# Patient Record
Sex: MALE | Race: WHITE | NOT HISPANIC OR LATINO | Employment: OTHER | URBAN - METROPOLITAN AREA
[De-identification: names, ages, dates, MRNs, and addresses within clinical notes are randomized per-mention and may not be internally consistent; named-entity substitution may affect disease eponyms.]

---

## 2018-07-09 ENCOUNTER — OFFICE VISIT (OUTPATIENT)
Dept: MEDICAL GROUP | Facility: PHYSICIAN GROUP | Age: 68
End: 2018-07-09
Payer: MEDICARE

## 2018-07-09 VITALS
TEMPERATURE: 97.7 F | RESPIRATION RATE: 12 BRPM | HEIGHT: 68 IN | SYSTOLIC BLOOD PRESSURE: 142 MMHG | DIASTOLIC BLOOD PRESSURE: 82 MMHG | OXYGEN SATURATION: 95 % | HEART RATE: 76 BPM | BODY MASS INDEX: 35.16 KG/M2 | WEIGHT: 232 LBS

## 2018-07-09 DIAGNOSIS — N18.4 CHRONIC KIDNEY DISEASE, STAGE IV (SEVERE) (HCC): ICD-10-CM

## 2018-07-09 DIAGNOSIS — E11.9 DIABETES MELLITUS WITHOUT COMPLICATION (HCC): ICD-10-CM

## 2018-07-09 DIAGNOSIS — I15.9 SECONDARY HYPERTENSION: ICD-10-CM

## 2018-07-09 DIAGNOSIS — D64.9 ANEMIA, UNSPECIFIED TYPE: ICD-10-CM

## 2018-07-09 DIAGNOSIS — I10 ESSENTIAL HYPERTENSION: ICD-10-CM

## 2018-07-09 PROCEDURE — 99214 OFFICE O/P EST MOD 30 MIN: CPT | Performed by: FAMILY MEDICINE

## 2018-07-09 RX ORDER — ATORVASTATIN CALCIUM 40 MG/1
TABLET, FILM COATED ORAL
Qty: 90 TAB | Refills: 1 | Status: SHIPPED | OUTPATIENT
Start: 2018-07-09 | End: 2018-12-07 | Stop reason: SDUPTHER

## 2018-07-09 RX ORDER — ALLOPURINOL 300 MG/1
300 TABLET ORAL
Qty: 90 TAB | Refills: 1 | Status: SHIPPED | OUTPATIENT
Start: 2018-07-09 | End: 2019-07-13 | Stop reason: SDUPTHER

## 2018-07-09 RX ORDER — PIOGLITAZONEHYDROCHLORIDE 15 MG/1
15 TABLET ORAL
Qty: 90 TAB | Refills: 1 | Status: SHIPPED | OUTPATIENT
Start: 2018-07-09 | End: 2018-12-02 | Stop reason: SDUPTHER

## 2018-07-09 RX ORDER — CARVEDILOL 6.25 MG/1
TABLET ORAL
Qty: 90 TAB | Refills: 1 | Status: SHIPPED | OUTPATIENT
Start: 2018-07-09 | End: 2018-12-02 | Stop reason: SDUPTHER

## 2018-07-09 RX ORDER — LISINOPRIL 20 MG/1
TABLET ORAL
Qty: 30 TAB | Refills: 2 | Status: SHIPPED | OUTPATIENT
Start: 2018-07-09 | End: 2018-10-04 | Stop reason: SDUPTHER

## 2018-07-09 RX ORDER — PRAZOSIN HYDROCHLORIDE 5 MG/1
5 CAPSULE ORAL EVERY EVENING
Qty: 30 CAP | Refills: 3 | Status: SHIPPED | OUTPATIENT
Start: 2018-07-09 | End: 2023-07-09

## 2018-07-09 RX ORDER — CALCITRIOL 0.25 UG/1
0.25 CAPSULE, LIQUID FILLED ORAL DAILY
Qty: 30 CAP | Refills: 0 | Status: SHIPPED | OUTPATIENT
Start: 2018-07-09 | End: 2020-10-31

## 2018-07-09 ASSESSMENT — ENCOUNTER SYMPTOMS
RESPIRATORY NEGATIVE: 1
EYES NEGATIVE: 1
HEARTBURN: 0
COUGH: 0
CARDIOVASCULAR NEGATIVE: 1
DIZZINESS: 0
HEADACHES: 0
BLURRED VISION: 0
TINGLING: 0
MYALGIAS: 0
BRUISES/BLEEDS EASILY: 0
DEPRESSION: 0
NEUROLOGICAL NEGATIVE: 1
FEVER: 0
PSYCHIATRIC NEGATIVE: 1
HEMOPTYSIS: 0
PALPITATIONS: 0
MUSCULOSKELETAL NEGATIVE: 1
GASTROINTESTINAL NEGATIVE: 1
DOUBLE VISION: 0
CHILLS: 0
NAUSEA: 0

## 2018-07-09 NOTE — PROGRESS NOTES
Subjective:      Dc Carrasco is a 67 y.o. male who presents with Cough            1. Diabetes mellitus without complication (HCC)  a1c today 6.4  Currently treated for DM, taking meds and checking bs at home, trying to do DM diet.controlled      2. Anemia, unspecified type  Seeing william of hem/onc and low on iron and doing workup     3. Chronic kidney disease, stage IV (severe) (HCC)  Sees gomez  Patient is currently being followed for chronic renal insufficiency. They are avoiding nsaids and maintaining hydration and following renal diet. Taking all appropriate meds. No new change in urine frequency or volume.      4. Essential hypertension  Currently treated for HTN, taking meds with no CP or sob, monitors bp at home periodically. controlled      Past Medical History:  No date: BPH (benign prostatic hypertrophy)  No date: Gout  No date: HYPERPARATHYROIDISM  No date: Hypertension  No date: Kidney disease      Comment: Chronic  No date: Polycystic kidney disease  Past Surgical History:  2/6/2018: ENDOSCOPY PROCEDURE      Comment: Procedure: ENDOSCOPY PROCEDURE;  Surgeon:                Charly Knight M.D.;  Location: SURGERY                [facility];  Service: Gastroenterology  2/6/2018: COLONOSCOPY - ENDO      Comment: Procedure: COLONOSCOPY - ENDO;  Surgeon:                Charly Knight M.D.;  Location: SURGERY                [facility];  Service: Gastroenterology  No date: HERNIA REPAIR  No date: OPEN REDUCTION  Smoking status: Never Smoker                                                              Smokeless tobacco: Never Used                      Alcohol use: Yes           0.6 oz/week     Standard drinks or equivalent: 1 per week     Comment: Moderate    History reviewed.  No pertinent family history.      Current Outpatient Prescriptions: •  Testosterone 10 MG/ACT (2%) Gel, Apply  to skin as directed., Disp: , Rfl: •  aspirin EC (ECOTRIN) 81 MG Tablet Delayed Response, Take 81 mg by mouth  "every day., Disp: , Rfl: •  allopurinol (ZYLOPRIM) 300 MG Tab, Take 1 Tab by mouth every day., Disp: 90 Tab, Rfl: 1•  atorvastatin (LIPITOR) 40 MG Tab, TAKE 1 TABLET BY MOUTH AT BEDTIME, Disp: 90 Tab, Rfl: 1•  calcitRIOL (ROCALTROL) 0.25 MCG Cap, Take 1 Cap by mouth every day. Pt takes Mon, Wed, Fri, Disp: 30 Cap, Rfl: 0•  carvedilol (COREG) 6.25 MG Tab, TAKE 1/2 TABLET ORALLY TWICE DAILY, Disp: 90 Tab, Rfl: 1•  lisinopril (PRINIVIL) 20 MG Tab, TAKE 1 TABLET BY MOUTH ONCE DAILY, Disp: 30 Tab, Rfl: 2•  pioglitazone (ACTOS) 15 MG Tab, Take 1 Tab by mouth every day., Disp: 90 Tab, Rfl: 1•  prazosin (MINIPRESS) 5 MG Cap, Take 1 Cap by mouth every evening., Disp: 30 Cap, Rfl: 3    Patient was instructed on the use of medications, either prescriptions or OTC and informed on when the appropriate follow up time period should be. In addition, patient was also instructed that should any acute worsening occur that they should notify this clinic asap or call 911.            Review of Systems   Constitutional: Positive for malaise/fatigue. Negative for chills and fever.   HENT: Negative.  Negative for hearing loss.    Eyes: Negative.  Negative for blurred vision and double vision.   Respiratory: Negative.  Negative for cough and hemoptysis.    Cardiovascular: Negative.  Negative for chest pain and palpitations.   Gastrointestinal: Negative.  Negative for heartburn and nausea.   Genitourinary: Negative.  Negative for dysuria.   Musculoskeletal: Negative.  Negative for myalgias.   Skin: Negative.  Negative for rash.   Neurological: Negative.  Negative for dizziness, tingling and headaches.   Endo/Heme/Allergies: Negative.  Does not bruise/bleed easily.   Psychiatric/Behavioral: Negative.  Negative for depression and suicidal ideas.   All other systems reviewed and are negative.         Objective:     /82   Pulse 76   Temp 36.5 °C (97.7 °F)   Resp 12   Ht 1.732 m (5' 8.2\")   Wt 105.2 kg (232 lb)   SpO2 95%   BMI 35.07 " kg/m²      Physical Exam   Constitutional: He is oriented to person, place, and time. He appears well-developed and well-nourished. No distress.   HENT:   Head: Normocephalic and atraumatic.   Mouth/Throat: Oropharynx is clear and moist. No oropharyngeal exudate.   Eyes: Pupils are equal, round, and reactive to light.   Cardiovascular: Normal rate, regular rhythm, normal heart sounds and intact distal pulses.  Exam reveals no gallop and no friction rub.    No murmur heard.  Pulmonary/Chest: Effort normal and breath sounds normal. No respiratory distress. He has no wheezes. He has no rales. He exhibits no tenderness.   Neurological: He is alert and oriented to person, place, and time.   Skin: He is not diaphoretic.   Psychiatric: He has a normal mood and affect. His behavior is normal. Judgment and thought content normal.   Nursing note and vitals reviewed.              Assessment/Plan:     1. Diabetes mellitus without complication ([facility])      2. Anemia, unspecified type      3. Chronic kidney disease, stage IV (severe) ([facility])      4. Essential hypertension

## 2018-10-04 DIAGNOSIS — I15.9 SECONDARY HYPERTENSION: ICD-10-CM

## 2018-10-04 RX ORDER — LISINOPRIL 20 MG/1
TABLET ORAL
Qty: 90 TAB | Refills: 0 | Status: SHIPPED | OUTPATIENT
Start: 2018-10-04 | End: 2019-01-10 | Stop reason: SDUPTHER

## 2018-12-02 DIAGNOSIS — I15.9 SECONDARY HYPERTENSION: ICD-10-CM

## 2018-12-03 RX ORDER — PIOGLITAZONEHYDROCHLORIDE 15 MG/1
TABLET ORAL
Qty: 90 TAB | Refills: 1 | Status: SHIPPED | OUTPATIENT
Start: 2018-12-03 | End: 2019-10-22 | Stop reason: SDUPTHER

## 2018-12-03 RX ORDER — CARVEDILOL 6.25 MG/1
TABLET ORAL
Qty: 90 TAB | Refills: 1 | Status: SHIPPED | OUTPATIENT
Start: 2018-12-03 | End: 2018-12-06 | Stop reason: SDUPTHER

## 2018-12-06 ENCOUNTER — OFFICE VISIT (OUTPATIENT)
Dept: MEDICAL GROUP | Facility: PHYSICIAN GROUP | Age: 68
End: 2018-12-06
Payer: MEDICARE

## 2018-12-06 VITALS
TEMPERATURE: 97.7 F | RESPIRATION RATE: 16 BRPM | OXYGEN SATURATION: 98 % | HEIGHT: 69 IN | SYSTOLIC BLOOD PRESSURE: 122 MMHG | DIASTOLIC BLOOD PRESSURE: 70 MMHG | HEART RATE: 75 BPM | WEIGHT: 246 LBS | BODY MASS INDEX: 36.43 KG/M2

## 2018-12-06 DIAGNOSIS — Z71.84 TRAVEL ADVICE ENCOUNTER: ICD-10-CM

## 2018-12-06 DIAGNOSIS — N18.4 CHRONIC KIDNEY DISEASE, STAGE IV (SEVERE) (HCC): ICD-10-CM

## 2018-12-06 DIAGNOSIS — E11.9 DIABETES MELLITUS WITHOUT COMPLICATION (HCC): ICD-10-CM

## 2018-12-06 DIAGNOSIS — I10 ESSENTIAL HYPERTENSION: ICD-10-CM

## 2018-12-06 PROCEDURE — 99214 OFFICE O/P EST MOD 30 MIN: CPT | Performed by: FAMILY MEDICINE

## 2018-12-06 RX ORDER — ALFUZOSIN HYDROCHLORIDE 10 MG/1
TABLET, EXTENDED RELEASE ORAL DAILY
COMMUNITY
End: 2023-10-12

## 2018-12-06 RX ORDER — CIPROFLOXACIN 250 MG/1
250 TABLET, FILM COATED ORAL 2 TIMES DAILY
Qty: 40 TAB | Refills: 0 | Status: SHIPPED | OUTPATIENT
Start: 2018-12-06 | End: 2020-10-31

## 2018-12-06 RX ORDER — CARVEDILOL 6.25 MG/1
3.12 TABLET ORAL 2 TIMES DAILY
Qty: 90 TAB | Refills: 1 | Status: SHIPPED | OUTPATIENT
Start: 2018-12-06 | End: 2020-01-23

## 2018-12-06 ASSESSMENT — ENCOUNTER SYMPTOMS
HEMOPTYSIS: 0
CHILLS: 0
GASTROINTESTINAL NEGATIVE: 1
CARDIOVASCULAR NEGATIVE: 1
HEADACHES: 0
MUSCULOSKELETAL NEGATIVE: 1
PALPITATIONS: 0
BRUISES/BLEEDS EASILY: 0
NAUSEA: 0
COUGH: 0
TINGLING: 0
BLURRED VISION: 0
HEARTBURN: 0
MYALGIAS: 0
NEUROLOGICAL NEGATIVE: 1
CONSTITUTIONAL NEGATIVE: 1
PSYCHIATRIC NEGATIVE: 1
RESPIRATORY NEGATIVE: 1
DEPRESSION: 0
EYES NEGATIVE: 1
DOUBLE VISION: 0
FEVER: 0
DIZZINESS: 0

## 2018-12-06 NOTE — PROGRESS NOTES
Subjective:      Dc Carrasco is a 67 y.o. male who presents with Diabetes            1. Essential hypertension  Currently treated for HTN, taking meds with no CP or sob, monitors bp at home periodically. controlled    - carvedilol (COREG) 6.25 MG Tab; Take 0.5 Tabs by mouth 2 Times a Day.  Dispense: 90 Tab; Refill: 1    2. Travel advice encounter  Traveling to [facility] and would like some pills for diarrhea should he get it there  - ciprofloxacin (CIPRO) 250 MG Tab; Take 1 Tab by mouth 2 times a day.  Dispense: 40 Tab; Refill: 0    3. Diabetes mellitus without complication (HCC)  Currently treated for DM, taking meds and checking bs at home, trying to do DM diet.controlled      4. Chronic kidney disease, stage IV (severe) (HCC)  Patient is currently being followed for chronic renal insufficiency. They are avoiding nsaids and maintaining hydration and following renal diet. Taking all appropriate meds. No new change in urine frequency or volume.  Seeing monty    Past Medical History:  No date: BPH (benign prostatic hypertrophy)  No date: Gout  No date: HYPERPARATHYROIDISM  No date: Hypertension  No date: Kidney disease      Comment:  Chronic  No date: Polycystic kidney disease  Past Surgical History:  2/6/2018: ENDOSCOPY PROCEDURE      Comment:  Procedure: ENDOSCOPY PROCEDURE;  Surgeon: Charly Knight M.D.;  Location: SURGERY [facility];  Service:               Gastroenterology  2/6/2018: COLONOSCOPY - ENDO      Comment:  Procedure: COLONOSCOPY - ENDO;  Surgeon: Charly Knight M.D.;  Location: SURGERY [facility];  Service:               Gastroenterology  No date: HERNIA REPAIR  No date: OPEN REDUCTION  Smoking status: Never Smoker                                                              Smokeless tobacco: Never Used                      Alcohol use: Yes           0.6 oz/week     Standard drinks or equivalent: 1 per week     Comment: Moderate    No family history on  file.      Current Outpatient Prescriptions: •  Tadalafil (CIALIS PO), Take  by mouth., Disp: , Rfl: •  MELATONIN PO, Take  by mouth., Disp: , Rfl: •  alfuzosin (UROXATRAL) 10 MG SR tablet, Take  by mouth every day., Disp: , Rfl: •  carvedilol (COREG) 6.25 MG Tab, Take 0.5 Tabs by mouth 2 Times a Day., Disp: 90 Tab, Rfl: 1•  ciprofloxacin (CIPRO) 250 MG Tab, Take 1 Tab by mouth 2 times a day., Disp: 40 Tab, Rfl: 0•  pioglitazone (ACTOS) 15 MG Tab, TAKE 1 TABLET BY MOUTH EVERY DAY, Disp: 90 Tab, Rfl: 1•  lisinopril (PRINIVIL) 20 MG Tab, TAKE 1 TABLET BY MOUTH EVERY DAY, Disp: 90 Tab, Rfl: 0•  allopurinol (ZYLOPRIM) 300 MG Tab, Take 1 Tab by mouth every day., Disp: 90 Tab, Rfl: 1•  atorvastatin (LIPITOR) 40 MG Tab, TAKE 1 TABLET BY MOUTH AT BEDTIME, Disp: 90 Tab, Rfl: 1•  calcitRIOL (ROCALTROL) 0.25 MCG Cap, Take 1 Cap by mouth every day. Pt takes Mon, Wed, Fri (Patient not taking: Reported on 12/6/2018), Disp: 30 Cap, Rfl: 0•  prazosin (MINIPRESS) 5 MG Cap, Take 1 Cap by mouth every evening. (Patient not taking: Reported on 12/6/2018), Disp: 30 Cap, Rfl: 3•  Testosterone 10 MG/ACT (2%) Gel, Apply  to skin as directed., Disp: , Rfl: •  aspirin EC (ECOTRIN) 81 MG Tablet Delayed Response, Take 81 mg by mouth every day., Disp: , Rfl:     Patient was instructed on the use of medications, either prescriptions or OTC and informed on when the appropriate follow up time period should be. In addition, patient was also instructed that should any acute worsening occur that they should notify this clinic asap or call 911.            Review of Systems   Constitutional: Negative.  Negative for chills and fever.   HENT: Negative.  Negative for hearing loss.    Eyes: Negative.  Negative for blurred vision and double vision.   Respiratory: Negative.  Negative for cough and hemoptysis.    Cardiovascular: Negative.  Negative for chest pain and palpitations.   Gastrointestinal: Negative.  Negative for heartburn and nausea.  "  Genitourinary: Negative.  Negative for dysuria.   Musculoskeletal: Negative.  Negative for myalgias.   Skin: Negative.  Negative for rash.   Neurological: Negative.  Negative for dizziness, tingling and headaches.   Endo/Heme/Allergies: Negative.  Does not bruise/bleed easily.   Psychiatric/Behavioral: Negative.  Negative for depression and suicidal ideas.   All other systems reviewed and are negative.         Objective:     /70 (BP Location: Left arm, Patient Position: Sitting)   Pulse 75   Temp 36.5 °C (97.7 °F)   Resp 16   Ht 1.753 m (5' 9\")   Wt 111.6 kg (246 lb)   SpO2 98%   BMI 36.33 kg/m²      Physical Exam   Constitutional: He is oriented to person, place, and time. He appears well-developed and well-nourished. No distress.   HENT:   Head: Normocephalic and atraumatic.   Right Ear: External ear normal.   Left Ear: External ear normal.   Nose: Nose normal.   Mouth/Throat: Oropharynx is clear and moist. No oropharyngeal exudate.   Eyes: Pupils are equal, round, and reactive to light. Right eye exhibits no discharge. Left eye exhibits no discharge. No scleral icterus.   Neck: Normal range of motion. Neck supple. No JVD present. No tracheal deviation present. No thyromegaly present.   Cardiovascular: Normal rate, regular rhythm, normal heart sounds and intact distal pulses.  Exam reveals no gallop and no friction rub.    No murmur heard.  Pulmonary/Chest: Effort normal and breath sounds normal. No stridor. No respiratory distress. He has no wheezes. He has no rales. He exhibits no tenderness.   Abdominal: Soft. He exhibits no distension. There is no tenderness.   Lymphadenopathy:     He has no cervical adenopathy.   Neurological: He is alert and oriented to person, place, and time. No cranial nerve deficit.   Skin: He is not diaphoretic.   Psychiatric: He has a normal mood and affect. His behavior is normal. Judgment and thought content normal.   Nursing note and vitals reviewed.            "   Assessment/Plan:     1. Essential hypertension    - carvedilol (COREG) 6.25 MG Tab; Take 0.5 Tabs by mouth 2 Times a Day.  Dispense: 90 Tab; Refill: 1    2. Travel advice encounter    - ciprofloxacin (CIPRO) 250 MG Tab; Take 1 Tab by mouth 2 times a day.  Dispense: 40 Tab; Refill: 0    3. Diabetes mellitus without complication (HCC)      4. Chronic kidney disease, stage IV (severe) (HCC)

## 2018-12-07 DIAGNOSIS — I15.9 SECONDARY HYPERTENSION: ICD-10-CM

## 2018-12-10 RX ORDER — ATORVASTATIN CALCIUM 40 MG/1
TABLET, FILM COATED ORAL
Qty: 90 TAB | Refills: 1 | Status: SHIPPED | OUTPATIENT
Start: 2018-12-10 | End: 2019-07-09 | Stop reason: SDUPTHER

## 2018-12-10 NOTE — TELEPHONE ENCOUNTER
Was the patient seen in the last year in this department? Yes    Does patient have an active prescription for medications requested? Yes    Received Request Via: Pharmacy    Last Visit:12/6/18  Last Lab:12/4/18

## 2019-01-10 DIAGNOSIS — I15.9 SECONDARY HYPERTENSION: ICD-10-CM

## 2019-01-10 RX ORDER — LISINOPRIL 20 MG/1
TABLET ORAL
Qty: 90 TAB | Refills: 0 | Status: SHIPPED | OUTPATIENT
Start: 2019-01-10 | End: 2019-07-09 | Stop reason: SDUPTHER

## 2019-01-10 NOTE — TELEPHONE ENCOUNTER
Was the patient seen in the last year in this department? Yes    Does patient have an active prescription for medications requested? Yes    Received Request Via: Pharmacy       Last visit: 12/06/18  Last labs:12/04/18

## 2019-01-17 DIAGNOSIS — E11.9 DIABETES MELLITUS WITHOUT COMPLICATION (HCC): ICD-10-CM

## 2019-05-16 LAB — HBA1C MFR BLD: 6.4 % (ref 0–5.6)

## 2019-05-21 ENCOUNTER — OFFICE VISIT (OUTPATIENT)
Dept: MEDICAL GROUP | Facility: PHYSICIAN GROUP | Age: 69
End: 2019-05-21
Payer: MEDICARE

## 2019-05-21 VITALS
OXYGEN SATURATION: 98 % | HEIGHT: 69 IN | RESPIRATION RATE: 12 BRPM | SYSTOLIC BLOOD PRESSURE: 142 MMHG | DIASTOLIC BLOOD PRESSURE: 80 MMHG | TEMPERATURE: 97.5 F | WEIGHT: 223 LBS | HEART RATE: 85 BPM | BODY MASS INDEX: 33.03 KG/M2

## 2019-05-21 DIAGNOSIS — E11.9 DIABETES MELLITUS WITHOUT COMPLICATION (HCC): ICD-10-CM

## 2019-05-21 DIAGNOSIS — N18.4 CHRONIC KIDNEY DISEASE, STAGE IV (SEVERE) (HCC): ICD-10-CM

## 2019-05-21 DIAGNOSIS — B35.1 TOENAIL FUNGUS: ICD-10-CM

## 2019-05-21 DIAGNOSIS — D50.8 IRON DEFICIENCY ANEMIA SECONDARY TO INADEQUATE DIETARY IRON INTAKE: ICD-10-CM

## 2019-05-21 PROCEDURE — 99214 OFFICE O/P EST MOD 30 MIN: CPT | Performed by: FAMILY MEDICINE

## 2019-05-21 RX ORDER — CICLOPIROX 80 MG/ML
SOLUTION TOPICAL
Qty: 6.6 ML | Refills: 3 | Status: SHIPPED | OUTPATIENT
Start: 2019-05-21 | End: 2019-06-04 | Stop reason: SDUPTHER

## 2019-05-21 RX ORDER — CICLOPIROX 80 MG/ML
SOLUTION TOPICAL
Qty: 6.6 ML | Refills: 3 | Status: SHIPPED | OUTPATIENT
Start: 2019-05-21 | End: 2019-05-21 | Stop reason: SDUPTHER

## 2019-05-21 ASSESSMENT — PATIENT HEALTH QUESTIONNAIRE - PHQ9: CLINICAL INTERPRETATION OF PHQ2 SCORE: 0

## 2019-05-22 ASSESSMENT — ENCOUNTER SYMPTOMS
RESPIRATORY NEGATIVE: 1
FEVER: 0
EYES NEGATIVE: 1
MYALGIAS: 0
GASTROINTESTINAL NEGATIVE: 1
HEARTBURN: 0
DIZZINESS: 0
BRUISES/BLEEDS EASILY: 0
MUSCULOSKELETAL NEGATIVE: 1
PALPITATIONS: 0
NAUSEA: 0
HEMOPTYSIS: 0
CHILLS: 0
DOUBLE VISION: 0
DEPRESSION: 0
PSYCHIATRIC NEGATIVE: 1
NEUROLOGICAL NEGATIVE: 1
HEADACHES: 0
CARDIOVASCULAR NEGATIVE: 1
TINGLING: 0
BLURRED VISION: 0
COUGH: 0
CONSTITUTIONAL NEGATIVE: 1

## 2019-05-22 NOTE — PROGRESS NOTES
Subjective:      Dc Carrasco is a 68 y.o. male who presents with Hypertension            1. Chronic kidney disease, stage IV (severe) (HCC)  Stable, seeing nephrology  Patient is currently being followed for chronic renal insufficiency. They are avoiding nsaids and maintaining hydration and following renal diet. Taking all appropriate meds. No new change in urine frequency or volume.      2. Diabetes mellitus without complication (HCC)  a1c under control  Currently treated for DM, taking meds and checking bs at home, trying to do DM diet.controlled      3. Iron deficiency anemia secondary to inadequate dietary iron intake  Will get iron infusion per hematology    4. Toenail fungus    - ciclopirox (PENLAC) 8 % solution; apply  Dispense: 6.6 mL; Refill: 3    Past Medical History:  No date: BPH (benign prostatic hypertrophy)  No date: Gout  No date: HYPERPARATHYROIDISM  No date: Hypertension  No date: Kidney disease      Comment:  Chronic  No date: Polycystic kidney disease  Past Surgical History:  2/6/2018: ENDOSCOPY PROCEDURE      Comment:  Procedure: ENDOSCOPY PROCEDURE;  Surgeon: Charly Knight M.D.;  Location: SURGERY [facility];  Service:               Gastroenterology  2/6/2018: COLONOSCOPY - ENDO      Comment:  Procedure: COLONOSCOPY - ENDO;  Surgeon: Charly Knight M.D.;  Location: SURGERY [facility];  Service:               Gastroenterology  No date: HERNIA REPAIR  No date: OPEN REDUCTION  Smoking status: Never Smoker                                                              Smokeless tobacco: Never Used                      Alcohol use: Yes           0.6 oz/week     Standard drinks or equivalent: 1 per week     Comment: Moderate    No family history on file.      Current Outpatient Prescriptions: •  ciclopirox (PENLAC) 8 % solution, apply, Disp: 6.6 mL, Rfl: 3•  lisinopril (PRINIVIL) 20 MG Tab, TAKE 1 TABLET BY MOUTH ONCE DAILY, Disp: 90 Tab, Rfl: 0•   atorvastatin (LIPITOR) 40 MG Tab, TAKE 1 TABLET BY MOUTH EVERYDAY AT BEDTIME, Disp: 90 Tab, Rfl: 1•  Tadalafil (CIALIS PO), Take  by mouth., Disp: , Rfl: •  MELATONIN PO, Take  by mouth., Disp: , Rfl: •  alfuzosin (UROXATRAL) 10 MG SR tablet, Take  by mouth every day., Disp: , Rfl: •  carvedilol (COREG) 6.25 MG Tab, Take 0.5 Tabs by mouth 2 Times a Day., Disp: 90 Tab, Rfl: 1•  ciprofloxacin (CIPRO) 250 MG Tab, Take 1 Tab by mouth 2 times a day., Disp: 40 Tab, Rfl: 0•  pioglitazone (ACTOS) 15 MG Tab, TAKE 1 TABLET BY MOUTH EVERY DAY, Disp: 90 Tab, Rfl: 1•  allopurinol (ZYLOPRIM) 300 MG Tab, Take 1 Tab by mouth every day., Disp: 90 Tab, Rfl: 1•  calcitRIOL (ROCALTROL) 0.25 MCG Cap, Take 1 Cap by mouth every day. Pt takes Mon, Wed, Fri (Patient not taking: Reported on 12/6/2018), Disp: 30 Cap, Rfl: 0•  prazosin (MINIPRESS) 5 MG Cap, Take 1 Cap by mouth every evening. (Patient not taking: Reported on 12/6/2018), Disp: 30 Cap, Rfl: 3•  Testosterone 10 MG/ACT (2%) Gel, Apply  to skin as directed., Disp: , Rfl: •  aspirin EC (ECOTRIN) 81 MG Tablet Delayed Response, Take 81 mg by mouth every day., Disp: , Rfl:     Patient was instructed on the use of medications, either prescriptions or OTC and informed on when the appropriate follow up time period should be. In addition, patient was also instructed that should any acute worsening occur that they should notify this clinic asap or call 911.            Review of Systems   Constitutional: Negative.  Negative for chills and fever.   HENT: Negative.  Negative for hearing loss.    Eyes: Negative.  Negative for blurred vision and double vision.   Respiratory: Negative.  Negative for cough and hemoptysis.    Cardiovascular: Negative.  Negative for chest pain and palpitations.   Gastrointestinal: Negative.  Negative for heartburn and nausea.   Genitourinary: Negative.  Negative for dysuria.   Musculoskeletal: Negative.  Negative for myalgias.   Skin: Negative.  Negative for rash.  "  Neurological: Negative.  Negative for dizziness, tingling and headaches.   Endo/Heme/Allergies: Negative.  Does not bruise/bleed easily.   Psychiatric/Behavioral: Negative.  Negative for depression and suicidal ideas.   All other systems reviewed and are negative.         Objective:     /80   Pulse 85   Temp 36.4 °C (97.5 °F)   Resp 12   Ht 1.753 m (5' 9\")   Wt 101.2 kg (223 lb)   SpO2 98%   BMI 32.93 kg/m²      Physical Exam   Constitutional: He is oriented to person, place, and time. He appears well-developed and well-nourished. No distress.   HENT:   Head: Normocephalic and atraumatic.   Mouth/Throat: Oropharynx is clear and moist. No oropharyngeal exudate.   Eyes: Pupils are equal, round, and reactive to light.   Cardiovascular: Normal rate, regular rhythm, normal heart sounds and intact distal pulses.  Exam reveals no gallop and no friction rub.    No murmur heard.  Pulmonary/Chest: Effort normal and breath sounds normal. No respiratory distress. He has no wheezes. He has no rales. He exhibits no tenderness.   Neurological: He is alert and oriented to person, place, and time.   Skin: He is not diaphoretic.   Extensive toenail fungus present   Psychiatric: He has a normal mood and affect. His behavior is normal. Judgment and thought content normal.   Nursing note and vitals reviewed.              Assessment/Plan:     1. Chronic kidney disease, stage IV (severe) ([facility])      2. Diabetes mellitus without complication ([facility])      3. Iron deficiency anemia secondary to inadequate dietary iron intake      4. Toenail fungus    - ciclopirox (PENLAC) 8 % solution; apply  Dispense: 6.6 mL; Refill: 3      "

## 2019-05-23 ENCOUNTER — TELEPHONE (OUTPATIENT)
Dept: MEDICAL GROUP | Facility: PHYSICIAN GROUP | Age: 69
End: 2019-05-23

## 2019-05-24 NOTE — TELEPHONE ENCOUNTER
Patient pharmacy needs clarification on his Sig for Ciclopirox 8%. They SIG just says apply. They need more information.

## 2019-06-04 DIAGNOSIS — B35.1 TOENAIL FUNGUS: ICD-10-CM

## 2019-06-04 RX ORDER — CICLOPIROX 80 MG/ML
SOLUTION TOPICAL
Qty: 6.6 ML | Refills: 3 | Status: SHIPPED | OUTPATIENT
Start: 2019-06-04 | End: 2023-07-09

## 2019-07-09 DIAGNOSIS — I15.9 SECONDARY HYPERTENSION: ICD-10-CM

## 2019-07-09 RX ORDER — LISINOPRIL 20 MG/1
TABLET ORAL
Qty: 90 TAB | Refills: 0 | Status: SHIPPED | OUTPATIENT
Start: 2019-07-09 | End: 2019-09-13 | Stop reason: SDUPTHER

## 2019-07-09 RX ORDER — ATORVASTATIN CALCIUM 40 MG/1
TABLET, FILM COATED ORAL
Qty: 90 TAB | Refills: 0 | Status: SHIPPED | OUTPATIENT
Start: 2019-07-09 | End: 2019-10-04 | Stop reason: SDUPTHER

## 2019-07-09 NOTE — TELEPHONE ENCOUNTER
Was the patient seen in the last year in this department? Yes    Does patient have an active prescription for medications requested? Yes    Received Request Via: Pharmacy     Last Visit:05/21/2019  Last Lab:04/17/2019

## 2019-07-13 DIAGNOSIS — I15.9 SECONDARY HYPERTENSION: ICD-10-CM

## 2019-07-15 RX ORDER — ALLOPURINOL 300 MG/1
TABLET ORAL
Qty: 100 TAB | Refills: 1 | Status: SHIPPED | OUTPATIENT
Start: 2019-07-15 | End: 2020-01-20

## 2019-09-11 DIAGNOSIS — I15.9 SECONDARY HYPERTENSION: ICD-10-CM

## 2019-09-13 DIAGNOSIS — I15.9 SECONDARY HYPERTENSION: ICD-10-CM

## 2019-09-13 NOTE — TELEPHONE ENCOUNTER
Was the patient seen in the last year in this department? Yes    Does patient have an active prescription for medications requested? Yes    Received Request Via: Pharmacy     Patient called stating he is currently taking 2 20 mg and needs a new Rx for the Lisinopril. Patient stated he would like the 40 mg or to give him more tabs for the 20 mg. Please advise.

## 2019-09-13 NOTE — TELEPHONE ENCOUNTER
Was the patient seen in the last year in this department? Yes    Does patient have an active prescription for medications requested? Yes    Received Request Via: Pharmacy     Last visit  5-21-19      Last lab   4-17-19

## 2019-09-16 RX ORDER — LISINOPRIL 20 MG/1
20 TABLET ORAL
Qty: 90 TAB | Refills: 3 | Status: SHIPPED | OUTPATIENT
Start: 2019-09-16 | End: 2020-10-31

## 2019-09-16 RX ORDER — LISINOPRIL 20 MG/1
TABLET ORAL
Qty: 90 TAB | Refills: 0 | Status: ON HOLD | OUTPATIENT
Start: 2019-09-16 | End: 2023-07-10

## 2019-10-04 DIAGNOSIS — I15.9 SECONDARY HYPERTENSION: ICD-10-CM

## 2019-10-07 RX ORDER — ATORVASTATIN CALCIUM 40 MG/1
TABLET, FILM COATED ORAL
Qty: 100 TAB | Refills: 1 | Status: SHIPPED | OUTPATIENT
Start: 2019-10-07 | End: 2020-05-04 | Stop reason: SDUPTHER

## 2019-10-22 ENCOUNTER — APPOINTMENT (RX ONLY)
Dept: URBAN - METROPOLITAN AREA CLINIC 22 | Facility: CLINIC | Age: 69
Setting detail: DERMATOLOGY
End: 2019-10-22

## 2019-10-22 DIAGNOSIS — L82.1 OTHER SEBORRHEIC KERATOSIS: ICD-10-CM

## 2019-10-22 DIAGNOSIS — D22 MELANOCYTIC NEVI: ICD-10-CM

## 2019-10-22 DIAGNOSIS — L81.4 OTHER MELANIN HYPERPIGMENTATION: ICD-10-CM

## 2019-10-22 DIAGNOSIS — L57.0 ACTINIC KERATOSIS: ICD-10-CM

## 2019-10-22 DIAGNOSIS — I15.9 SECONDARY HYPERTENSION: ICD-10-CM

## 2019-10-22 DIAGNOSIS — D18.0 HEMANGIOMA: ICD-10-CM

## 2019-10-22 PROBLEM — D22.9 MELANOCYTIC NEVI, UNSPECIFIED: Status: ACTIVE | Noted: 2019-10-22

## 2019-10-22 PROBLEM — D18.01 HEMANGIOMA OF SKIN AND SUBCUTANEOUS TISSUE: Status: ACTIVE | Noted: 2019-10-22

## 2019-10-22 PROBLEM — I10 ESSENTIAL (PRIMARY) HYPERTENSION: Status: ACTIVE | Noted: 2019-10-22

## 2019-10-22 PROBLEM — E13.9 OTHER SPECIFIED DIABETES MELLITUS WITHOUT COMPLICATIONS: Status: ACTIVE | Noted: 2019-10-22

## 2019-10-22 PROBLEM — E78.5 HYPERLIPIDEMIA, UNSPECIFIED: Status: ACTIVE | Noted: 2019-10-22

## 2019-10-22 PROCEDURE — ? LIQUID NITROGEN

## 2019-10-22 PROCEDURE — 99202 OFFICE O/P NEW SF 15 MIN: CPT | Mod: 25

## 2019-10-22 PROCEDURE — ? COUNSELING

## 2019-10-22 PROCEDURE — 17000 DESTRUCT PREMALG LESION: CPT

## 2019-10-22 PROCEDURE — 17003 DESTRUCT PREMALG LES 2-14: CPT

## 2019-10-22 ASSESSMENT — LOCATION DETAILED DESCRIPTION DERM
LOCATION DETAILED: RIGHT LATERAL MALAR CHEEK
LOCATION DETAILED: LEFT SUPERIOR FOREHEAD
LOCATION DETAILED: LEFT FOREHEAD
LOCATION DETAILED: LEFT MEDIAL FOREHEAD
LOCATION DETAILED: RIGHT CLAVICULAR SKIN
LOCATION DETAILED: RIGHT SUPERIOR FOREHEAD

## 2019-10-22 ASSESSMENT — LOCATION ZONE DERM
LOCATION ZONE: FACE
LOCATION ZONE: TRUNK

## 2019-10-22 ASSESSMENT — LOCATION SIMPLE DESCRIPTION DERM
LOCATION SIMPLE: RIGHT FOREHEAD
LOCATION SIMPLE: RIGHT CHEEK
LOCATION SIMPLE: LEFT FOREHEAD
LOCATION SIMPLE: RIGHT CLAVICULAR SKIN

## 2019-10-22 NOTE — PROCEDURE: LIQUID NITROGEN
Render Note In Bullet Format When Appropriate: No
Duration Of Freeze Thaw-Cycle (Seconds): 5
Post-Care Instructions: I reviewed with the patient in detail post-care instructions. Patient is to wear sunprotection, and avoid picking at any of the treated lesions. Pt may apply Vaseline to crusted or scabbing areas.
Render Post-Care Instructions In Note?: yes
Number Of Freeze-Thaw Cycles: 1 freeze-thaw cycle
Consent: The patient's consent was obtained including but not limited to risks of crusting, scabbing, blistering, scarring, darker or lighter pigmentary change, recurrence, incomplete removal and infection.
Detail Level: Simple

## 2019-10-22 NOTE — TELEPHONE ENCOUNTER
Was the patient seen in the last year in this department? Yes     Does patient have an active prescription for medications requested? Yes     Received Request Via: Pharmacy      Last visit  5-21-19        Last lab   10/22/2019

## 2019-10-23 RX ORDER — PIOGLITAZONEHYDROCHLORIDE 15 MG/1
TABLET ORAL
Qty: 100 TAB | Refills: 1 | Status: SHIPPED | OUTPATIENT
Start: 2019-10-23 | End: 2020-04-01 | Stop reason: SDUPTHER

## 2020-01-19 DIAGNOSIS — I15.9 SECONDARY HYPERTENSION: ICD-10-CM

## 2020-01-20 RX ORDER — ALLOPURINOL 300 MG/1
TABLET ORAL
Qty: 90 TAB | Refills: 2 | Status: SHIPPED | OUTPATIENT
Start: 2020-01-20 | End: 2021-03-18

## 2020-01-23 DIAGNOSIS — I10 ESSENTIAL HYPERTENSION: ICD-10-CM

## 2020-01-23 RX ORDER — CARVEDILOL 6.25 MG/1
6.25 TABLET ORAL 2 TIMES DAILY WITH MEALS
Qty: 90 TAB | Refills: 1 | Status: SHIPPED | OUTPATIENT
Start: 2020-01-23 | End: 2020-02-03

## 2020-01-31 DIAGNOSIS — I10 ESSENTIAL HYPERTENSION: ICD-10-CM

## 2020-02-03 RX ORDER — CARVEDILOL 6.25 MG/1
6.25 TABLET ORAL 2 TIMES DAILY
Qty: 180 TAB | Refills: 1 | Status: SHIPPED | OUTPATIENT
Start: 2020-02-03 | End: 2020-10-05

## 2020-04-01 DIAGNOSIS — I15.9 SECONDARY HYPERTENSION: ICD-10-CM

## 2020-04-01 RX ORDER — PIOGLITAZONEHYDROCHLORIDE 15 MG/1
15 TABLET ORAL
Qty: 100 TAB | Refills: 1 | Status: SHIPPED | OUTPATIENT
Start: 2020-04-01 | End: 2020-09-29

## 2020-04-01 NOTE — TELEPHONE ENCOUNTER
Received request via: Patient    Was the patient seen in the last year in this department? Yes    Does the patient have an active prescription (recently filled or refills available) for medication(s) requested? no

## 2020-05-04 DIAGNOSIS — I15.9 SECONDARY HYPERTENSION: ICD-10-CM

## 2020-05-04 RX ORDER — ATORVASTATIN CALCIUM 40 MG/1
TABLET, FILM COATED ORAL
Qty: 100 TAB | Refills: 0 | Status: SHIPPED | OUTPATIENT
Start: 2020-05-04 | End: 2020-10-05

## 2020-05-28 ENCOUNTER — TELEPHONE (OUTPATIENT)
Dept: MEDICAL GROUP | Facility: PHYSICIAN GROUP | Age: 70
End: 2020-05-28

## 2020-05-28 NOTE — TELEPHONE ENCOUNTER
Pt stated he was at [facility] in [place] and needed his labs faxed over so they could draw blood but there is no current labs ordered in his chart for him

## 2020-06-02 ENCOUNTER — OFFICE VISIT (OUTPATIENT)
Dept: MEDICAL GROUP | Facility: PHYSICIAN GROUP | Age: 70
End: 2020-06-02
Payer: MEDICARE

## 2020-06-02 VITALS
WEIGHT: 218.4 LBS | HEIGHT: 70 IN | SYSTOLIC BLOOD PRESSURE: 180 MMHG | TEMPERATURE: 99 F | HEART RATE: 68 BPM | RESPIRATION RATE: 16 BRPM | DIASTOLIC BLOOD PRESSURE: 82 MMHG | OXYGEN SATURATION: 96 % | BODY MASS INDEX: 31.26 KG/M2

## 2020-06-02 DIAGNOSIS — I10 ESSENTIAL HYPERTENSION: ICD-10-CM

## 2020-06-02 DIAGNOSIS — N18.5 CKD (CHRONIC KIDNEY DISEASE), STAGE V (HCC): ICD-10-CM

## 2020-06-02 DIAGNOSIS — E11.9 DIABETES MELLITUS WITHOUT COMPLICATION (HCC): ICD-10-CM

## 2020-06-02 DIAGNOSIS — Z01.818 PREOP EXAMINATION: ICD-10-CM

## 2020-06-02 PROCEDURE — 99214 OFFICE O/P EST MOD 30 MIN: CPT | Performed by: FAMILY MEDICINE

## 2020-06-02 ASSESSMENT — PATIENT HEALTH QUESTIONNAIRE - PHQ9: CLINICAL INTERPRETATION OF PHQ2 SCORE: 0

## 2020-06-02 ASSESSMENT — FIBROSIS 4 INDEX: FIB4 SCORE: 2.09

## 2020-06-03 NOTE — PROGRESS NOTES
Over 50% of this 25 minute visit ( all time was face to face) was spent on counseling and coordination of care for the problem of      Patient with stage V CRI and scheduled to do kidney transplant an [facility]  preop labs and tests requested by them    1. CKD (chronic kidney disease), stage V (HCC)    - Prothrombin Time; Future  - US-CAROTID DOPPLER BILAT; Future  - US-EXTREMITY ARTERY LOWER UNILAT RIGHT; Future  - US-EXTREMITY ARTERY LOWER UNILAT LEFT; Future  - REFERRAL TO GASTROENTEROLOGY  - EC-STEPHANE W/ CONT; Future  - [id number]; Future  - DX-CHEST-2 VIEWS; Future  - CT-ABDOMEN-PELVIS W/O; Future    2. Preop examination    - Prothrombin Time; Future  - US-CAROTID DOPPLER BILAT; Future  - US-EXTREMITY ARTERY LOWER UNILAT RIGHT; Future  - US-EXTREMITY ARTERY LOWER UNILAT LEFT; Future  - REFERRAL TO GASTROENTEROLOGY  - EC-STEPHANE W/ CONT; Future  - [id number]; Future  - DX-CHEST-2 VIEWS; Future  - CT-ABDOMEN-PELVIS W/O; Future    3. Diabetes mellitus without complication (HCC)    - Prothrombin Time; Future  - US-CAROTID DOPPLER BILAT; Future  - US-EXTREMITY ARTERY LOWER UNILAT RIGHT; Future  - US-EXTREMITY ARTERY LOWER UNILAT LEFT; Future  - REFERRAL TO GASTROENTEROLOGY  - EC-STEPHANE W/ CONT; Future  - [id number]; Future  - DX-CHEST-2 VIEWS; Future  - CT-ABDOMEN-PELVIS W/O; Future    4. Essential hypertension  - Prothrombin Time; Future  - US-CAROTID DOPPLER BILAT; Future  - US-EXTREMITY ARTERY LOWER UNILAT RIGHT; Future  - US-EXTREMITY ARTERY LOWER UNILAT LEFT; Future  - REFERRAL TO GASTROENTEROLOGY  - EC-STEPHANE W/ CONT; Future  - [id number]; Future  - DX-CHEST-2 VIEWS; Future  - CT-ABDOMEN-PELVIS W/O; Future    Past Medical History:   Diagnosis Date   • BPH (benign prostatic hypertrophy)    • Gout    • High blood pressure    • HYPERPARATHYROIDISM    • Hypertension    • Kidney disease     Chronic   • Polycystic kidney disease      Past Surgical History:   Procedure  Laterality Date   • ENDOSCOPY PROCEDURE  2/6/2018    Procedure: ENDOSCOPY PROCEDURE;  Surgeon: Charly Knight M.D.;  Location: SURGERY [facility];  Service: Gastroenterology   • COLONOSCOPY - ENDO  2/6/2018    Procedure: COLONOSCOPY - ENDO;  Surgeon: Charly Knight M.D.;  Location: SURGERY [facility];  Service: Gastroenterology   • HERNIA REPAIR     • OPEN REDUCTION       Social History     Tobacco Use   • Smoking status: Never Smoker   • Smokeless tobacco: Never Used   Substance Use Topics   • Alcohol use: Not Currently     Alcohol/week: 0.6 oz     Types: 1 Standard drinks or equivalent per week     Comment: Moderate   • Drug use: No     History reviewed. No pertinent family history.      Current Outpatient Medications:   •  atorvastatin (LIPITOR) 40 MG Tab, TAKE 1 TABLET BY MOUTH EVERYDAY AT BEDTIME, Disp: 100 Tab, Rfl: 0  •  pioglitazone (ACTOS) 15 MG Tab, Take 1 Tab by mouth every day., Disp: 100 Tab, Rfl: 1  •  carvedilol (COREG) 6.25 MG Tab, Take 1 Tab by mouth 2 times a day., Disp: 180 Tab, Rfl: 1  •  allopurinol (ZYLOPRIM) 300 MG Tab, TAKE 1 TABLET BY MOUTH EVERY DAY, Disp: 90 Tab, Rfl: 2  •  lisinopril (PRINIVIL) 20 MG Tab, TAKE 1 TABLET BY MOUTH EVERY DAY, Disp: 90 Tab, Rfl: 0  •  ciclopirox (PENLAC) 8 % solution, apply, Disp: 6.6 mL, Rfl: 3  •  Tadalafil (CIALIS PO), Take  by mouth., Disp: , Rfl:   •  MELATONIN PO, Take  by mouth., Disp: , Rfl:   •  alfuzosin (UROXATRAL) 10 MG SR tablet, Take  by mouth every day., Disp: , Rfl:   •  Testosterone 10 MG/ACT (2%) Gel, Apply  to skin as directed., Disp: , Rfl:   •  aspirin EC (ECOTRIN) 81 MG Tablet Delayed Response, Take 81 mg by mouth every day., Disp: , Rfl:   •  lisinopril (PRINIVIL) 20 MG Tab, Take 1 Tab by mouth every day. (Patient not taking: Reported on 6/2/2020), Disp: 90 Tab, Rfl: 3  •  ciprofloxacin (CIPRO) 250 MG Tab, Take 1 Tab by mouth 2 times a day. (Patient not taking: Reported on 6/2/2020), Disp: 40 Tab, Rfl: 0  •  calcitRIOL  (ROCALTROL) 0.25 MCG Cap, Take 1 Cap by mouth every day. Pt takes Mon, Wed, Fri (Patient not taking: Reported on 12/6/2018), Disp: 30 Cap, Rfl: 0  •  prazosin (MINIPRESS) 5 MG Cap, Take 1 Cap by mouth every evening. (Patient not taking: Reported on 12/6/2018), Disp: 30 Cap, Rfl: 3    Patient was instructed on the use of medications, either prescriptions or OTC and informed on when the appropriate follow up time period should be. In addition, patient was also instructed that should any acute worsening occur that they should notify this clinic asap or call 911.

## 2020-06-17 ENCOUNTER — HOSPITAL ENCOUNTER (OUTPATIENT)
Dept: RADIOLOGY | Facility: MEDICAL CENTER | Age: 70
End: 2020-06-17
Attending: FAMILY MEDICINE
Payer: MEDICARE

## 2020-06-17 ENCOUNTER — TELEPHONE (OUTPATIENT)
Dept: MEDICAL GROUP | Facility: PHYSICIAN GROUP | Age: 70
End: 2020-06-17

## 2020-06-17 ENCOUNTER — APPOINTMENT (OUTPATIENT)
Dept: LAB | Facility: MEDICAL CENTER | Age: 70
End: 2020-06-17
Payer: MEDICARE

## 2020-06-17 DIAGNOSIS — N18.5 CKD (CHRONIC KIDNEY DISEASE), STAGE V (HCC): ICD-10-CM

## 2020-06-17 DIAGNOSIS — Z01.818 PREOP EXAMINATION: ICD-10-CM

## 2020-06-17 DIAGNOSIS — E11.9 DIABETES MELLITUS WITHOUT COMPLICATION (HCC): ICD-10-CM

## 2020-06-17 DIAGNOSIS — I10 ESSENTIAL HYPERTENSION: ICD-10-CM

## 2020-06-17 PROCEDURE — 93880 EXTRACRANIAL BILAT STUDY: CPT | Mod: 26 | Performed by: INTERNAL MEDICINE

## 2020-06-17 PROCEDURE — 93922 UPR/L XTREMITY ART 2 LEVELS: CPT

## 2020-06-17 PROCEDURE — 74176 CT ABD & PELVIS W/O CONTRAST: CPT

## 2020-06-17 PROCEDURE — 93880 EXTRACRANIAL BILAT STUDY: CPT

## 2020-06-17 PROCEDURE — 93922 UPR/L XTREMITY ART 2 LEVELS: CPT | Performed by: INTERNAL MEDICINE

## 2020-06-17 PROCEDURE — 71046 X-RAY EXAM CHEST 2 VIEWS: CPT

## 2020-06-17 NOTE — TELEPHONE ENCOUNTER
Spoke to imaging and gave them clarification that everything that was ordered was advised from transplant provider

## 2020-07-24 ENCOUNTER — TELEPHONE (OUTPATIENT)
Dept: MEDICAL GROUP | Facility: PHYSICIAN GROUP | Age: 70
End: 2020-07-24

## 2020-07-24 NOTE — TELEPHONE ENCOUNTER
Pt called with some bp readings and is wondering If you would like to change his meds. He has 172/112, 152/92, 148/100, 185/144, 165/107, 136/94, 167/103, 165/102. I offered pt an appt on Monday but he said it was to hard to get down the hill as he doesn't have a vehicle. Please advise.

## 2020-07-28 NOTE — TELEPHONE ENCOUNTER
Phone Number Called: [phone] (home)       Call outcome: Did not leave a detailed message. Requested patient to call back.    Message: requested pt give me a callback regarding his bp

## 2020-08-11 ENCOUNTER — TELEPHONE (OUTPATIENT)
Dept: MEDICAL GROUP | Facility: PHYSICIAN GROUP | Age: 70
End: 2020-08-11

## 2020-08-11 NOTE — TELEPHONE ENCOUNTER
Fax received from [facility] stating patient will need a few immunizations prior to kidney transplant. We only have 2 of the 4 he is needing. Called patient to recommend calling [facility] to ask if they have all of them available to get it done at once. I provided him with the number to call them.

## 2020-09-27 DIAGNOSIS — I15.9 SECONDARY HYPERTENSION: ICD-10-CM

## 2020-09-29 RX ORDER — PIOGLITAZONEHYDROCHLORIDE 15 MG/1
TABLET ORAL
Qty: 90 TAB | Refills: 3 | Status: SHIPPED | OUTPATIENT
Start: 2020-09-29 | End: 2021-11-15

## 2020-10-02 DIAGNOSIS — I10 ESSENTIAL HYPERTENSION: ICD-10-CM

## 2020-10-02 DIAGNOSIS — I15.9 SECONDARY HYPERTENSION: ICD-10-CM

## 2020-10-05 RX ORDER — ATORVASTATIN CALCIUM 40 MG/1
TABLET, FILM COATED ORAL
Qty: 90 TAB | Refills: 1 | Status: SHIPPED | OUTPATIENT
Start: 2020-10-05 | End: 2021-04-29

## 2020-10-05 RX ORDER — CARVEDILOL 6.25 MG/1
TABLET ORAL
Qty: 180 TAB | Refills: 1 | Status: ON HOLD | OUTPATIENT
Start: 2020-10-05 | End: 2023-07-10

## 2020-11-19 ENCOUNTER — TELEPHONE (OUTPATIENT)
Dept: MEDICAL GROUP | Facility: PHYSICIAN GROUP | Age: 70
End: 2020-11-19

## 2020-11-19 NOTE — TELEPHONE ENCOUNTER
VOICEMAIL  1. Caller Name: Dc Carrasco                        Call Back Number: [phone] (home)      2. Message: Patient called and left a message for us to call him back. I have called him back and left him a message to sofya us back.     3. Patient approves office to leave a detailed voicemail/MyChart message: yes

## 2020-11-24 ENCOUNTER — OFFICE VISIT (OUTPATIENT)
Dept: MEDICAL GROUP | Facility: PHYSICIAN GROUP | Age: 70
End: 2020-11-24
Payer: MEDICARE

## 2020-11-24 VITALS
BODY MASS INDEX: 32.5 KG/M2 | OXYGEN SATURATION: 96 % | WEIGHT: 227 LBS | RESPIRATION RATE: 14 BRPM | TEMPERATURE: 98.2 F | DIASTOLIC BLOOD PRESSURE: 82 MMHG | SYSTOLIC BLOOD PRESSURE: 144 MMHG | HEIGHT: 70 IN | HEART RATE: 80 BPM

## 2020-11-24 DIAGNOSIS — M10.071 ACUTE IDIOPATHIC GOUT INVOLVING TOE OF RIGHT FOOT: ICD-10-CM

## 2020-11-24 DIAGNOSIS — N18.4 CHRONIC KIDNEY DISEASE, STAGE IV (SEVERE) (HCC): ICD-10-CM

## 2020-11-24 DIAGNOSIS — E11.9 DIABETES MELLITUS WITHOUT COMPLICATION (HCC): ICD-10-CM

## 2020-11-24 PROCEDURE — 99214 OFFICE O/P EST MOD 30 MIN: CPT | Performed by: FAMILY MEDICINE

## 2020-11-24 ASSESSMENT — ENCOUNTER SYMPTOMS
NAUSEA: 0
RESPIRATORY NEGATIVE: 1
CARDIOVASCULAR NEGATIVE: 1
MYALGIAS: 0
FEVER: 0
DEPRESSION: 0
NEUROLOGICAL NEGATIVE: 1
EYES NEGATIVE: 1
DOUBLE VISION: 0
HEARTBURN: 0
DIZZINESS: 0
GASTROINTESTINAL NEGATIVE: 1
CHILLS: 0
CONSTITUTIONAL NEGATIVE: 1
COUGH: 0
PALPITATIONS: 0
HEADACHES: 0
TINGLING: 0
HEMOPTYSIS: 0
BLURRED VISION: 0
BRUISES/BLEEDS EASILY: 0
PSYCHIATRIC NEGATIVE: 1

## 2020-11-24 ASSESSMENT — FIBROSIS 4 INDEX: FIB4 SCORE: 1.88

## 2020-11-24 NOTE — PROGRESS NOTES
Subjective:      Dc Carrasco is a 69 y.o. male who presents with Gout and Knee Pain            1. Acute idiopathic gout involving toe of right foot  Intermittent pain in the right great toe, on allopurinol but would like some meds for when it flares up  Due to cri and dm cannot use po nsaids nor steroids  - Diclofenac Sodium (VOLTAREN) 1 % Gel; Place 1 g on the skin 2 times a day as needed.  Dispense: 100 g; Refill: 3    2. Chronic kidney disease, stage IV (severe) (HCC)  On transplant list, Patient is currently being followed for chronic renal insufficiency. They are avoiding nsaids and maintaining hydration and following renal diet. Taking all appropriate meds. No new change in urine frequency or volume.      3. Diabetes mellitus without complication (HCC)  Currently treated for DM, taking meds and checking bs at home, trying to do DM diet.controlled      Past Medical History:  No date: BPH (benign prostatic hypertrophy)  No date: Gout  No date: High blood pressure  No date: HYPERPARATHYROIDISM  No date: Hypertension  No date: Kidney disease      Comment:  Chronic  No date: Polycystic kidney disease  Past Surgical History:  2/6/2018: ENDOSCOPY PROCEDURE      Comment:  Procedure: ENDOSCOPY PROCEDURE;  Surgeon: Charly Knight M.D.;  Location: SURGERY [facility];  Service:               Gastroenterology  2/6/2018: COLONOSCOPY - ENDO      Comment:  Procedure: COLONOSCOPY - ENDO;  Surgeon: Charly Knight M.D.;  Location: SURGERY [facility];  Service:               Gastroenterology  No date: HERNIA REPAIR  No date: OPEN REDUCTION  Social History    Tobacco Use      Smoking status: Never Smoker      Smokeless tobacco: Never Used    Alcohol use: Not Currently      Alcohol/week: 0.6 oz      Types: 1 Standard drinks or equivalent per week      Comment: Moderate    Drug use: No    No family history on file.      Current Outpatient Medications: •  Diclofenac Sodium (VOLTAREN) 1 %  Gel, Place 1 g on the skin 2 times a day as needed., Disp: 100 g, Rfl: 3•  naphazoline-pheniramine (NAPHCON-A) 0.025-0.3 % ophthalmic solution, Place 1 Drop in both eyes 4 times a day., Disp: 15 mL, Rfl: 0•  carvedilol (COREG) 6.25 MG Tab, TAKE 1 TABLET BY MOUTH TWICE A DAY, Disp: 180 Tab, Rfl: 1•  atorvastatin (LIPITOR) 40 MG Tab, TAKE 1 TABLET BY MOUTH EVERYDAY AT BEDTIME, Disp: 90 Tab, Rfl: 1•  pioglitazone (ACTOS) 15 MG Tab, TAKE 1 TABLET BY MOUTH EVERY DAY, Disp: 90 Tab, Rfl: 3•  allopurinol (ZYLOPRIM) 300 MG Tab, TAKE 1 TABLET BY MOUTH EVERY DAY, Disp: 90 Tab, Rfl: 2•  lisinopril (PRINIVIL) 20 MG Tab, TAKE 1 TABLET BY MOUTH EVERY DAY, Disp: 90 Tab, Rfl: 0•  ciclopirox (PENLAC) 8 % solution, apply, Disp: 6.6 mL, Rfl: 3•  Tadalafil (CIALIS PO), Take  by mouth., Disp: , Rfl: •  MELATONIN PO, Take  by mouth., Disp: , Rfl: •  alfuzosin (UROXATRAL) 10 MG SR tablet, Take  by mouth every day., Disp: , Rfl: •  prazosin (MINIPRESS) 5 MG Cap, Take 1 Cap by mouth every evening. (Patient not taking: Reported on 12/6/2018), Disp: 30 Cap, Rfl: 3•  Testosterone 10 MG/ACT (2%) Gel, Apply  to skin as directed., Disp: , Rfl: •  aspirin EC (ECOTRIN) 81 MG Tablet Delayed Response, Take 81 mg by mouth every day., Disp: , Rfl:     Patient was instructed on the use of medications, either prescriptions or OTC and informed on when the appropriate follow up time period should be. In addition, patient was also instructed that should any acute worsening occur that they should notify this clinic asap or call 911.          Review of Systems   Constitutional: Negative.  Negative for chills and fever.   HENT: Negative.  Negative for hearing loss.    Eyes: Negative.  Negative for blurred vision and double vision.   Respiratory: Negative.  Negative for cough and hemoptysis.    Cardiovascular: Negative.  Negative for chest pain and palpitations.   Gastrointestinal: Negative.  Negative for heartburn and nausea.   Genitourinary: Negative.   "Negative for dysuria.   Musculoskeletal: Positive for joint pain. Negative for myalgias.   Skin: Negative.  Negative for rash.   Neurological: Negative.  Negative for dizziness, tingling and headaches.   Endo/Heme/Allergies: Negative.  Does not bruise/bleed easily.   Psychiatric/Behavioral: Negative.  Negative for depression and suicidal ideas.   All other systems reviewed and are negative.         Objective:     /82 (BP Location: Left arm, Patient Position: Sitting, BP Cuff Size: Adult)   Pulse 80   Temp 36.8 °C (98.2 °F) (Temporal)   Resp 14   Ht 1.778 m (5' 10\")   Wt 103 kg (227 lb)   SpO2 96%   BMI 32.57 kg/m²      Physical Exam  Vitals signs and nursing note reviewed.   Constitutional:       General: He is not in acute distress.     Appearance: He is well-developed. He is not diaphoretic.   HENT:      Head: Normocephalic and atraumatic.      Mouth/Throat:      Pharynx: No oropharyngeal exudate.   Eyes:      Pupils: Pupils are equal, round, and reactive to light.   Cardiovascular:      Rate and Rhythm: Normal rate and regular rhythm.      Heart sounds: Normal heart sounds. No murmur. No friction rub. No gallop.    Pulmonary:      Effort: Pulmonary effort is normal. No respiratory distress.      Breath sounds: Normal breath sounds. No wheezing or rales.   Chest:      Chest wall: No tenderness.   Neurological:      Mental Status: He is alert and oriented to person, place, and time.   Psychiatric:         Behavior: Behavior normal.         Thought Content: Thought content normal.         Judgment: Judgment normal.                 Assessment/Plan:        1. Acute idiopathic gout involving toe of right foot    - Diclofenac Sodium (VOLTAREN) 1 % Gel; Place 1 g on the skin 2 times a day as needed.  Dispense: 100 g; Refill: 3    2. Chronic kidney disease, stage IV (severe) (HCC)      3. Diabetes mellitus without complication (HCC)      "

## 2021-01-15 ENCOUNTER — TELEPHONE (OUTPATIENT)
Dept: MEDICAL GROUP | Facility: PHYSICIAN GROUP | Age: 71
End: 2021-01-15

## 2021-01-15 NOTE — TELEPHONE ENCOUNTER
Pt called and left a voicemail requesting more information of the covid vaccine.     LVM to advise [facility] process of the covid vaccine and where [facility] is giving them. Also advised if pt preferred to get one by where he lives he would have to call his local health department.

## 2021-03-18 DIAGNOSIS — I15.9 SECONDARY HYPERTENSION: ICD-10-CM

## 2021-03-18 RX ORDER — ALLOPURINOL 300 MG/1
TABLET ORAL
Qty: 90 TABLET | Refills: 2 | Status: SHIPPED | OUTPATIENT
Start: 2021-03-18 | End: 2023-07-09

## 2021-03-22 ENCOUNTER — APPOINTMENT (RX ONLY)
Dept: URBAN - METROPOLITAN AREA CLINIC 31 | Facility: CLINIC | Age: 71
Setting detail: DERMATOLOGY
End: 2021-03-22

## 2021-03-22 DIAGNOSIS — L85.3 XEROSIS CUTIS: ICD-10-CM

## 2021-03-22 PROCEDURE — ? PRESCRIPTION

## 2021-03-22 PROCEDURE — 99213 OFFICE O/P EST LOW 20 MIN: CPT

## 2021-03-22 PROCEDURE — ? TREATMENT REGIMEN

## 2021-03-22 PROCEDURE — ? COUNSELING

## 2021-03-22 RX ORDER — TRIAMCINOLONE ACETONIDE 1 MG/G
CREAM TOPICAL
Qty: 1 | Refills: 3 | Status: ERX | COMMUNITY
Start: 2021-03-22

## 2021-03-22 RX ADMIN — TRIAMCINOLONE ACETONIDE THIN LAYER: 1 CREAM TOPICAL at 00:00

## 2021-03-22 ASSESSMENT — LOCATION SIMPLE DESCRIPTION DERM
LOCATION SIMPLE: CHEST
LOCATION SIMPLE: UPPER BACK

## 2021-03-22 ASSESSMENT — LOCATION DETAILED DESCRIPTION DERM
LOCATION DETAILED: SUPERIOR THORACIC SPINE
LOCATION DETAILED: STERNUM

## 2021-03-22 ASSESSMENT — LOCATION ZONE DERM: LOCATION ZONE: TRUNK

## 2021-03-22 NOTE — PROCEDURE: TREATMENT REGIMEN
Initiate Treatment: triamcinolone BID x 2 wks.\\nEmollient moisturizer BID. Recommend CeraVe Cream.\\nAvoid long, hot showers and cut back on hot tub use, at least until current flare controlled. \\nPt may opt to try humidifier at night to combat dryness and running of heat during winter months. \\nContact us if sx not controlled in 2 wks.
Detail Level: Zone

## 2021-04-13 ENCOUNTER — APPOINTMENT (RX ONLY)
Dept: URBAN - METROPOLITAN AREA CLINIC 31 | Facility: CLINIC | Age: 71
Setting detail: DERMATOLOGY
End: 2021-04-13

## 2021-04-13 DIAGNOSIS — L30.9 DERMATITIS, UNSPECIFIED: ICD-10-CM

## 2021-04-13 PROCEDURE — ? TREATMENT REGIMEN

## 2021-04-13 PROCEDURE — 11102 TANGNTL BX SKIN SINGLE LES: CPT

## 2021-04-13 PROCEDURE — ? COUNSELING

## 2021-04-13 PROCEDURE — ? BIOPSY BY SHAVE METHOD

## 2021-04-13 PROCEDURE — 99213 OFFICE O/P EST LOW 20 MIN: CPT | Mod: 25

## 2021-04-13 ASSESSMENT — LOCATION DETAILED DESCRIPTION DERM
LOCATION DETAILED: LEFT MEDIAL INFERIOR CHEST
LOCATION DETAILED: LEFT LATERAL SUPERIOR CHEST

## 2021-04-13 ASSESSMENT — LOCATION ZONE DERM: LOCATION ZONE: TRUNK

## 2021-04-13 ASSESSMENT — LOCATION SIMPLE DESCRIPTION DERM: LOCATION SIMPLE: CHEST

## 2021-04-13 NOTE — PROCEDURE: TREATMENT REGIMEN
Detail Level: Zone
Continue Regimen: Emollient moisturizer BID.\\nRe-initiate triamcinolone BID x 2 additional weeks. Pt counseled that rash does appear improved since last visit, with fewer areas affected. \\nBx today.\\nPt advised to cut back on showers to every other day, and attempt to shorten showers and lessen temperature. Pt also counseled not to use hot tub for next 2 weeks. Pt verbalizes understanding and feels he can adhere to these guidelines.\\nF/u in 2 wks.

## 2021-04-22 ENCOUNTER — APPOINTMENT (RX ONLY)
Dept: URBAN - METROPOLITAN AREA CLINIC 31 | Facility: CLINIC | Age: 71
Setting detail: DERMATOLOGY
End: 2021-04-22

## 2021-04-22 DIAGNOSIS — D18.0 HEMANGIOMA: ICD-10-CM

## 2021-04-22 DIAGNOSIS — L57.0 ACTINIC KERATOSIS: ICD-10-CM

## 2021-04-22 DIAGNOSIS — L30.0 NUMMULAR DERMATITIS: ICD-10-CM | Status: IMPROVED

## 2021-04-22 DIAGNOSIS — L81.4 OTHER MELANIN HYPERPIGMENTATION: ICD-10-CM

## 2021-04-22 DIAGNOSIS — D22 MELANOCYTIC NEVI: ICD-10-CM

## 2021-04-22 DIAGNOSIS — L21.8 OTHER SEBORRHEIC DERMATITIS: ICD-10-CM | Status: INADEQUATELY CONTROLLED

## 2021-04-22 DIAGNOSIS — Z71.89 OTHER SPECIFIED COUNSELING: ICD-10-CM

## 2021-04-22 DIAGNOSIS — L82.1 OTHER SEBORRHEIC KERATOSIS: ICD-10-CM

## 2021-04-22 PROBLEM — D22.71 MELANOCYTIC NEVI OF RIGHT LOWER LIMB, INCLUDING HIP: Status: ACTIVE | Noted: 2021-04-22

## 2021-04-22 PROBLEM — D23.72 OTHER BENIGN NEOPLASM OF SKIN OF LEFT LOWER LIMB, INCLUDING HIP: Status: ACTIVE | Noted: 2021-04-22

## 2021-04-22 PROBLEM — D23.71 OTHER BENIGN NEOPLASM OF SKIN OF RIGHT LOWER LIMB, INCLUDING HIP: Status: ACTIVE | Noted: 2021-04-22

## 2021-04-22 PROBLEM — D22.61 MELANOCYTIC NEVI OF RIGHT UPPER LIMB, INCLUDING SHOULDER: Status: ACTIVE | Noted: 2021-04-22

## 2021-04-22 PROBLEM — D22.72 MELANOCYTIC NEVI OF LEFT LOWER LIMB, INCLUDING HIP: Status: ACTIVE | Noted: 2021-04-22

## 2021-04-22 PROBLEM — D22.5 MELANOCYTIC NEVI OF TRUNK: Status: ACTIVE | Noted: 2021-04-22

## 2021-04-22 PROBLEM — D18.01 HEMANGIOMA OF SKIN AND SUBCUTANEOUS TISSUE: Status: ACTIVE | Noted: 2021-04-22

## 2021-04-22 PROBLEM — D22.62 MELANOCYTIC NEVI OF LEFT UPPER LIMB, INCLUDING SHOULDER: Status: ACTIVE | Noted: 2021-04-22

## 2021-04-22 PROCEDURE — ? COUNSELING

## 2021-04-22 PROCEDURE — 17003 DESTRUCT PREMALG LES 2-14: CPT

## 2021-04-22 PROCEDURE — 17000 DESTRUCT PREMALG LESION: CPT

## 2021-04-22 PROCEDURE — ? LIQUID NITROGEN

## 2021-04-22 PROCEDURE — ? PRESCRIPTION

## 2021-04-22 PROCEDURE — ? TREATMENT REGIMEN

## 2021-04-22 PROCEDURE — 99214 OFFICE O/P EST MOD 30 MIN: CPT | Mod: 25

## 2021-04-22 RX ORDER — CLOBETASOL PROPIONATE 0.5 MG/ML
SOLUTION TOPICAL
Qty: 1 | Refills: 3 | Status: ERX | COMMUNITY
Start: 2021-04-22

## 2021-04-22 RX ADMIN — CLOBETASOL PROPIONATE SM AMT: 0.5 SOLUTION TOPICAL at 00:00

## 2021-04-22 ASSESSMENT — LOCATION SIMPLE DESCRIPTION DERM
LOCATION SIMPLE: LEFT POPLITEAL SKIN
LOCATION SIMPLE: LEFT KNEE
LOCATION SIMPLE: LEFT EAR
LOCATION SIMPLE: LEFT SHOULDER
LOCATION SIMPLE: LEFT POSTERIOR THIGH
LOCATION SIMPLE: LEFT CHEEK
LOCATION SIMPLE: RIGHT UPPER BACK
LOCATION SIMPLE: RIGHT FOREARM
LOCATION SIMPLE: LEFT LOWER BACK
LOCATION SIMPLE: RIGHT KNEE
LOCATION SIMPLE: RIGHT POSTERIOR THIGH
LOCATION SIMPLE: CHEST
LOCATION SIMPLE: RIGHT HAND
LOCATION SIMPLE: RIGHT SHOULDER
LOCATION SIMPLE: LEFT HAND
LOCATION SIMPLE: ABDOMEN
LOCATION SIMPLE: RIGHT TEMPLE
LOCATION SIMPLE: RIGHT POPLITEAL SKIN
LOCATION SIMPLE: LEFT THIGH
LOCATION SIMPLE: RIGHT CHEEK
LOCATION SIMPLE: SCALP
LOCATION SIMPLE: RIGHT ELBOW
LOCATION SIMPLE: RIGHT THIGH
LOCATION SIMPLE: LEFT ELBOW
LOCATION SIMPLE: LEFT FOREARM

## 2021-04-22 ASSESSMENT — LOCATION DETAILED DESCRIPTION DERM
LOCATION DETAILED: LEFT ANTECUBITAL SKIN
LOCATION DETAILED: LEFT ANTERIOR EARLOBE
LOCATION DETAILED: LEFT ULNAR DORSAL HAND
LOCATION DETAILED: EPIGASTRIC SKIN
LOCATION DETAILED: LEFT DISTAL POSTERIOR THIGH
LOCATION DETAILED: LEFT SUPERIOR PARIETAL SCALP
LOCATION DETAILED: LEFT ANTERIOR PROXIMAL THIGH
LOCATION DETAILED: RIGHT PROXIMAL DORSAL FOREARM
LOCATION DETAILED: RIGHT ANTERIOR PROXIMAL THIGH
LOCATION DETAILED: LEFT SUPERIOR MEDIAL MIDBACK
LOCATION DETAILED: RIGHT RADIAL DORSAL HAND
LOCATION DETAILED: LEFT VENTRAL DISTAL FOREARM
LOCATION DETAILED: LEFT KNEE
LOCATION DETAILED: RIGHT MEDIAL UPPER BACK
LOCATION DETAILED: RIGHT INFERIOR UPPER BACK
LOCATION DETAILED: LEFT DISTAL DORSAL FOREARM
LOCATION DETAILED: RIGHT CENTRAL TEMPLE
LOCATION DETAILED: RIGHT CENTRAL MALAR CHEEK
LOCATION DETAILED: LEFT PROXIMAL DORSAL FOREARM
LOCATION DETAILED: LEFT MEDIAL SUPERIOR CHEST
LOCATION DETAILED: RIGHT KNEE
LOCATION DETAILED: RIGHT POPLITEAL SKIN
LOCATION DETAILED: LEFT POSTERIOR SHOULDER
LOCATION DETAILED: RIGHT VENTRAL PROXIMAL FOREARM
LOCATION DETAILED: LEFT CENTRAL MALAR CHEEK
LOCATION DETAILED: RIGHT VENTRAL DISTAL FOREARM
LOCATION DETAILED: LEFT POPLITEAL SKIN
LOCATION DETAILED: RIGHT POSTERIOR SHOULDER
LOCATION DETAILED: RIGHT DISTAL POSTERIOR THIGH
LOCATION DETAILED: RIGHT LATERAL ELBOW
LOCATION DETAILED: LEFT INFERIOR CENTRAL MALAR CHEEK
LOCATION DETAILED: PERIUMBILICAL SKIN

## 2021-04-22 ASSESSMENT — LOCATION ZONE DERM
LOCATION ZONE: EAR
LOCATION ZONE: LEG
LOCATION ZONE: TRUNK
LOCATION ZONE: HAND
LOCATION ZONE: ARM
LOCATION ZONE: SCALP
LOCATION ZONE: FACE

## 2021-04-22 NOTE — PROCEDURE: TREATMENT REGIMEN
Detail Level: Zone
Continue Regimen: Emollient moisturizer BID. Limit hot tub use and keep showers less frequent, shorter, and less hot. \\nPt feels sx are greatly improved by adhering to regimen over past week. Elects to keep planned f/u visit in 1 wk, so that we can re-evaluate should any sx worsen. Otherwise, pt will plan to f/u for next annual FBE. Sooner if rash recurs or worsens.
Initiate Treatment: Head & Shoulders shampoo, allowing lather for 3-5min before rinsing.\\nApply clobetasol daily for up to 2 wks per month as needed for flares. Medication instructions reviewed and pt verbalizes understanding.

## 2021-04-29 DIAGNOSIS — I15.9 SECONDARY HYPERTENSION: ICD-10-CM

## 2021-04-29 RX ORDER — ATORVASTATIN CALCIUM 40 MG/1
TABLET, FILM COATED ORAL
Qty: 90 TABLET | Refills: 1 | Status: SHIPPED | OUTPATIENT
Start: 2021-04-29

## 2021-07-30 ENCOUNTER — TELEPHONE (OUTPATIENT)
Dept: MEDICAL GROUP | Facility: PHYSICIAN GROUP | Age: 71
End: 2021-07-30

## 2021-07-30 NOTE — TELEPHONE ENCOUNTER
Pt is requesting lab orders for kidney function, per pt he would like his GFR ordered and sent to [facility]. Their fax number is [phone]

## 2021-08-02 NOTE — TELEPHONE ENCOUNTER
Phone Number Called: [phone] (home)       Call outcome: Left detailed message for patient. Informed to call back with any additional questions.    Message: LVM to advise that order has been placed for labs and faxed over to [facility]. Advised to call back with any questions.

## 2021-11-15 DIAGNOSIS — I15.9 SECONDARY HYPERTENSION: ICD-10-CM

## 2021-11-15 RX ORDER — PIOGLITAZONEHYDROCHLORIDE 15 MG/1
TABLET ORAL
Qty: 90 TABLET | Refills: 0 | Status: SHIPPED | OUTPATIENT
Start: 2021-11-15 | End: 2022-02-07

## 2022-02-07 DIAGNOSIS — I15.9 SECONDARY HYPERTENSION: ICD-10-CM

## 2022-02-07 RX ORDER — PIOGLITAZONEHYDROCHLORIDE 15 MG/1
TABLET ORAL
Qty: 90 TABLET | Refills: 0 | Status: SHIPPED | OUTPATIENT
Start: 2022-02-07

## 2022-07-19 ENCOUNTER — TELEPHONE (OUTPATIENT)
Dept: HEALTH INFORMATION MANAGEMENT | Facility: OTHER | Age: 72
End: 2022-07-19

## 2022-07-19 NOTE — TELEPHONE ENCOUNTER
Outcome: NO ANSWER / NO MESSAGE    Please transfer to Patient Outreach Team at [phone] when patient returns call.      Attempt # 1

## 2023-01-19 NOTE — TELEPHONE ENCOUNTER
Imaging called stating that they need clarification on pt's lower extremity US. Per tech pt is coming in today and they can be reached at [phone].   Interpolation Flap Text: A decision was made to reconstruct the defect utilizing an interpolation axial flap and a staged reconstruction.  A telfa template was made of the defect.  This telfa template was then used to outline the interpolation flap.  The donor area for the pedicle flap was then injected with anesthesia.  The flap was excised through the skin and subcutaneous tissue down to the layer of the underlying musculature.  The interpolation flap was carefully excised within this deep plane to maintain its blood supply.  The edges of the donor site were undermined.   The donor site was closed in a primary fashion.  The pedicle was then rotated into position and sutured.  Once the tube was sutured into place, adequate blood supply was confirmed with blanching and refill.  The pedicle was then wrapped with xeroform gauze and dressed appropriately with a telfa and gauze bandage to ensure continued blood supply and protect the attached pedicle.

## 2023-04-03 PROBLEM — Z94.0 RENAL TRANSPLANT, STATUS POST: Status: ACTIVE | Noted: 2023-04-03

## 2023-04-03 PROBLEM — D84.9 IMMUNOSUPPRESSED STATUS (HCC): Status: ACTIVE | Noted: 2023-04-03

## 2023-05-07 PROBLEM — Z86.010 HX OF COLONIC POLYP: Status: ACTIVE | Noted: 2023-05-07

## 2023-07-09 PROBLEM — N17.9 AKI (ACUTE KIDNEY INJURY) (HCC): Status: ACTIVE | Noted: 2023-07-09

## 2023-07-09 PROBLEM — I50.9 HEART FAILURE (HCC): Status: ACTIVE | Noted: 2023-07-09

## 2023-10-13 PROBLEM — Z86.718 HISTORY OF DVT (DEEP VEIN THROMBOSIS): Status: ACTIVE | Noted: 2023-10-13

## 2023-11-13 PROBLEM — Z65.8 INADEQUATE SOCIAL SUPPORT: Status: ACTIVE | Noted: 2023-11-13

## 2023-11-13 PROBLEM — R41.89 COGNITIVE DECLINE: Status: ACTIVE | Noted: 2023-11-13

## 2023-11-15 ENCOUNTER — OFFICE VISIT (OUTPATIENT)
Dept: URGENT CARE | Facility: CLINIC | Age: 73
End: 2023-11-15
Payer: MEDICARE

## 2023-11-15 VITALS
HEART RATE: 70 BPM | HEIGHT: 71 IN | OXYGEN SATURATION: 95 % | TEMPERATURE: 97.4 F | WEIGHT: 226 LBS | RESPIRATION RATE: 14 BRPM | BODY MASS INDEX: 31.64 KG/M2 | SYSTOLIC BLOOD PRESSURE: 150 MMHG | DIASTOLIC BLOOD PRESSURE: 110 MMHG

## 2023-11-15 DIAGNOSIS — I10 ESSENTIAL HYPERTENSION: ICD-10-CM

## 2023-11-15 DIAGNOSIS — L08.9 SOFT TISSUE INFECTION: ICD-10-CM

## 2023-11-15 PROCEDURE — 3077F SYST BP >= 140 MM HG: CPT | Performed by: PHYSICIAN ASSISTANT

## 2023-11-15 PROCEDURE — 3080F DIAST BP >= 90 MM HG: CPT | Performed by: PHYSICIAN ASSISTANT

## 2023-11-15 PROCEDURE — 99213 OFFICE O/P EST LOW 20 MIN: CPT | Performed by: PHYSICIAN ASSISTANT

## 2023-11-15 RX ORDER — DOXYCYCLINE HYCLATE 100 MG
100 TABLET ORAL 2 TIMES DAILY
Qty: 10 TABLET | Refills: 0 | Status: SHIPPED | OUTPATIENT
Start: 2023-11-15 | End: 2023-11-20

## 2023-11-15 RX ORDER — SULFAMETHOXAZOLE AND TRIMETHOPRIM 800; 160 MG/1; MG/1
1 TABLET ORAL 2 TIMES DAILY
Qty: 10 TABLET | Refills: 0 | Status: SHIPPED | OUTPATIENT
Start: 2023-11-15 | End: 2023-11-15

## 2023-11-15 ASSESSMENT — ENCOUNTER SYMPTOMS
FEVER: 0
NAUSEA: 0
CHILLS: 0
VOMITING: 0

## 2023-11-15 ASSESSMENT — FIBROSIS 4 INDEX: FIB4 SCORE: 1.07

## 2023-11-16 NOTE — PROGRESS NOTES
Subjective:   Dc Carrasco is a 72 y.o. male who presents for Finger Pain (R hand, middle swelling x yesterday )        Patient presents with concerns of right middle finger redness, swelling, pain that started about 2 to 3 days ago.  Patient does not recall exactly when.  He states that he has some memory issues.  He is not having any numbness or tingling.  Denies difficulty moving the digit.  No nausea, vomiting, fevers, chills.  He is not taking any medications for his symptoms.      Review of Systems   Constitutional:  Negative for chills and fever.   Gastrointestinal:  Negative for nausea and vomiting.       PMH:  has a past medical history of BPH (benign prostatic hypertrophy), Gout, High blood pressure, HYPERPARATHYROIDISM, Hypertension, Kidney disease, and Polycystic kidney disease.    He has no past medical history of Addisons disease ([facility]), Adrenal disorder ([facility]), Allergy, Anxiety, Arrhythmia, Arthritis, Asthma, Blood transfusion without reported diagnosis, Cancer ([facility]), Cataract, CHF (congestive heart failure) ([facility]), Clotting disorder ([facility]), COPD (chronic obstructive pulmonary disease) ([facility]), Cushings syndrome ([facility]), Depression, Diabetic neuropathy ([facility]), GERD (gastroesophageal reflux disease), Glaucoma, Goiter, Head ache, Heart attack ([facility]), Heart murmur, HIV (human immunodeficiency virus infection) ([facility]), Hyperlipidemia, IBD (inflammatory bowel disease), Meningitis, Migraine, Muscle disorder, Osteoporosis, Pituitary disease ([facility]), Pulmonary emphysema ([facility]), Seizure ([facility]), Sickle cell disease ([facility]), Stroke ([facility]), Substance abuse ([facility]), Thyroid disease, Tuberculosis, or Urinary tract infection.  MEDS:   Current Outpatient Medications:     donepezil (ARICEPT) 5 MG Tab, Take 1 Tablet by mouth every evening., Disp: 30 Tablet, Rfl: 3    doxycycline (VIBRAMYCIN) 100 MG Tab, Take 1 Tablet by mouth 2 times a day for 5 days., Disp: 10 Tablet, Rfl: 0    vitamin D2, Ergocalciferol, (DRISDOL) 1.25 MG (29120 UT)  Cap capsule, Take  by mouth every 7 days., Disp: , Rfl:     furosemide (LASIX) 20 MG Tab, Take 40 mg by mouth 2 times a day., Disp: , Rfl:     tacrolimus (PROGRAF) 0.5 MG Cap, 1 cap daily in evening with 1 mg cap or as directed by transplant team, Disp: 90 Capsule, Rfl: 3    lisinopril (PRINIVIL) 10 MG Tab, 2 tabs daily, Disp: 180 Tablet, Rfl: 3    glipiZIDE (GLUCOTROL) 10 MG Tab, 2 tsbs bid, Disp: 360 Tablet, Rfl: 3    polyethylene glycol/lytes (MIRALAX) 17 g Pack, Take 1 Packet by mouth every day., Disp: 14 Each, Rfl: 3    apixaban (ELIQUIS) 5mg Tab, Take 1 Tablet by mouth 2 times a day., Disp: 60 Tablet, Rfl: 0    metFORMIN (GLUCOPHAGE) 500 MG Tab, Take 500 mg by mouth 2 times a day with meals., Disp: , Rfl:     carvedilol (COREG) 12.5 MG Tab, Take 12.5 mg by mouth every 12 hours., Disp: , Rfl:     tacrolimus (PROGRAF) 1 MG Cap, Take 2 mg by mouth every 12 hours. 2 mg am 1 1/2 pm, Disp: , Rfl:     linagliptin (TRADJENTA) 5 MG Tab tablet, Take 5 mg by mouth every day., Disp: , Rfl:     predniSONE (DELTASONE) 10 MG Tab, Take 5 mg by mouth every day., Disp: , Rfl:     atorvastatin (LIPITOR) 40 MG Tab, TAKE 1 TABLET BY MOUTH EVERYDAY AT BEDTIME, Disp: 90 tablet, Rfl: 1    betamethasone dipropionate (DIPROLENE) 0.05 % Ointment, Apply 1 application on the skin twice a day. Apply to chest twice a day for 2-3 weeks. (Patient not taking: Reported on 10/12/2023), Disp: , Rfl:     pioglitazone (ACTOS) 15 MG Tab, TAKE 1 TABLET BY MOUTH EVERY DAY (Patient not taking: Reported on 10/12/2023), Disp: 90 Tablet, Rfl: 0  ALLERGIES: No Known Allergies  SURGHX:   Past Surgical History:   Procedure Laterality Date    ENDOSCOPY PROCEDURE  2/6/2018    Procedure: ENDOSCOPY PROCEDURE;  Surgeon: Charly Knight M.D.;  Location: SURGERY [facility];  Service: Gastroenterology    COLONOSCOPY - ENDO  2/6/2018    Procedure: COLONOSCOPY - ENDO;  Surgeon: Charly Knight M.D.;  Location: SURGERY [facility];  Service: Gastroenterology     "HERNIA REPAIR      OPEN REDUCTION       SOCHX:  reports that he has never smoked. He has never used smokeless tobacco. He reports that he does not currently use alcohol after a past usage of about 0.6 oz of alcohol per week. He reports that he does not use drugs.  FH: Family history was reviewed, no pertinent findings to report   Objective:   BP (!) 150/110 (BP Location: Left arm, Patient Position: Sitting, BP Cuff Size: Adult)   Pulse 70   Temp 36.3 °C (97.4 °F) (Temporal)   Resp 14   Ht 1.791 m (5' 10.5\")   Wt 103 kg (226 lb)   SpO2 95%   BMI 31.97 kg/m²   Physical Exam  Vitals reviewed.   Constitutional:       General: He is not in acute distress.     Appearance: Normal appearance. He is well-developed. He is not toxic-appearing.   HENT:      Head: Normocephalic and atraumatic.      Right Ear: External ear normal.      Left Ear: External ear normal.      Nose: Nose normal.   Cardiovascular:      Rate and Rhythm: Normal rate and regular rhythm.   Pulmonary:      Effort: Pulmonary effort is normal. No respiratory distress.      Breath sounds: No stridor.   Skin:     General: Skin is dry.      Comments: Patient has mild erythema and edema adjacent to the nail of the right middle finger.  Scant serous discharge expressed with palpation.  No fluctuance.  Digit range of motion within normal limits.  Distal sensation intact and cap refills brisk.   Neurological:      Comments: Alert and oriented.    Psychiatric:         Attention and Perception: Attention normal.         Mood and Affect: Mood normal.         Speech: Speech normal.         Behavior: Behavior normal.         Cognition and Memory: Memory is impaired.           Assessment/Plan:   1. Soft tissue infection  - doxycycline (VIBRAMYCIN) 100 MG Tab; Take 1 Tablet by mouth 2 times a day for 5 days.  Dispense: 10 Tablet; Refill: 0    2. Essential hypertension    I&D not indicated at this time.  Started on Epsom salt soaks and an antibiotic.  Wound care " instructions reviewed.  If symptoms fail to improve within 48 hours, new symptoms develop at any point, or symptoms worsen patient should return to clinic for reevaluation.    2.  Patient's blood pressure is elevated today.  States that he is compliant with his blood pressure medications.  Recommend that he monitor and follow-up with primary care soonest.

## 2024-01-15 PROBLEM — F03.90 DEMENTIA WITHOUT BEHAVIORAL DISTURBANCE (HCC): Status: ACTIVE | Noted: 2023-11-13
